# Patient Record
Sex: MALE | Race: WHITE | NOT HISPANIC OR LATINO
[De-identification: names, ages, dates, MRNs, and addresses within clinical notes are randomized per-mention and may not be internally consistent; named-entity substitution may affect disease eponyms.]

---
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
## 2020-04-10 ENCOUNTER — APPOINTMENT (OUTPATIENT)
Dept: PEDIATRIC GASTROENTEROLOGY | Facility: CLINIC | Age: 20
End: 2020-04-10
Payer: COMMERCIAL

## 2020-04-10 DIAGNOSIS — K92.1 MELENA: ICD-10-CM

## 2020-04-10 PROCEDURE — 99214 OFFICE O/P EST MOD 30 MIN: CPT | Mod: 95

## 2020-04-10 RX ORDER — AMOXICILLIN AND CLAVULANATE POTASSIUM 875; 125 MG/1; MG/1
875-125 TABLET, COATED ORAL
Qty: 28 | Refills: 0 | Status: COMPLETED | COMMUNITY
Start: 2020-02-29

## 2020-05-26 RX ORDER — MESALAMINE 1000 MG/1
1000 SUPPOSITORY RECTAL
Qty: 90 | Refills: 2 | Status: ACTIVE | COMMUNITY
Start: 2020-04-17 | End: 1900-01-01

## 2021-02-08 ENCOUNTER — APPOINTMENT (OUTPATIENT)
Dept: COLORECTAL SURGERY | Facility: CLINIC | Age: 21
End: 2021-02-08
Payer: COMMERCIAL

## 2021-02-08 VITALS
SYSTOLIC BLOOD PRESSURE: 110 MMHG | OXYGEN SATURATION: 98 % | HEIGHT: 68 IN | HEART RATE: 75 BPM | DIASTOLIC BLOOD PRESSURE: 71 MMHG | TEMPERATURE: 97 F | BODY MASS INDEX: 21.98 KG/M2 | RESPIRATION RATE: 15 BRPM | WEIGHT: 145 LBS

## 2021-02-08 DIAGNOSIS — T69.1XXA CHILBLAINS, INITIAL ENCOUNTER: ICD-10-CM

## 2021-02-08 DIAGNOSIS — K51.20 ULCERATIVE (CHRONIC) PROCTITIS W/OUT COMPLICATIONS: ICD-10-CM

## 2021-02-08 DIAGNOSIS — Z80.3 FAMILY HISTORY OF MALIGNANT NEOPLASM OF BREAST: ICD-10-CM

## 2021-02-08 PROCEDURE — 99205 OFFICE O/P NEW HI 60 MIN: CPT | Mod: 25

## 2021-02-08 PROCEDURE — 99072 ADDL SUPL MATRL&STAF TM PHE: CPT

## 2021-02-08 PROCEDURE — 46600 DIAGNOSTIC ANOSCOPY SPX: CPT

## 2021-02-08 RX ORDER — CETIRIZINE HCL 10 MG
10 TABLET ORAL
Refills: 0 | Status: ACTIVE | COMMUNITY

## 2021-02-08 RX ORDER — MESALAMINE 1.2 G/1
1.2 TABLET, DELAYED RELEASE ORAL
Refills: 0 | Status: ACTIVE | COMMUNITY

## 2021-02-08 NOTE — HISTORY OF PRESENT ILLNESS
[FreeTextEntry1] : Patient is a 21 yo WM here to discuss his colitis.  He states that back in 2015 he was diagnosed with C-d iff and then referred to see GI.  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  He was initially treated with mesalamine suppositories and then it did go dormant and he did not require any treatment until about 10 months ago.  At that time he developed bloody bowel movements, increase in bowel movements all loose in nature, abdominal pain.  He was given a colonoscopy and that showed colitis and he was restarted on mesalamine oral and suppository.  He medication was increased a few weeks ago and there has been a slight improvement.

## 2021-02-08 NOTE — REASON FOR VISIT
[Consultation] : a consultation visit [Parent] : parent [FreeTextEntry1] : Patient intake forms reviewed

## 2021-02-08 NOTE — REVIEW OF SYSTEMS
[Abdominal Pain] : abdominal pain [Diarrhea] : diarrhea [Negative] : Heme/Lymph [FreeTextEntry7] : colitis [de-identified] : Eczema

## 2021-02-08 NOTE — PHYSICAL EXAM
[Normal Breath Sounds] : Normal breath sounds [Normal Heart Sounds] : normal heart sounds [Normal Rate and Rhythm] : normal rate and rhythm [No Rash or Lesion] : No rash or lesion [Alert] : alert [Oriented to Person] : oriented to person [Oriented to Place] : oriented to place [Oriented to Time] : oriented to time [Calm] : calm [None] : no anal fissures seen [Excoriation] : no perianal excoriation [Multiple Sinus Tracts] : no perianal sinus tracts [Fistula] : no fistulas [Wart] : no warts [Ulcer ___ cm] : no ulcers [Pilonidal Cyst] : no pilonidal cysts [Pilonidal Sinus] : no pilonidal sinus [Pilonidal Sinus Draining] : no pilonidal sinus drainage [Tender, Swollen] : nontender, non-swollen [Thrombosed] : that was not thrombosed [Skin Tags] : there were no residual hemorrhoidal skin tags seen [Normal] : was normal [JVD] : no jugular venous distention  [Wheezing] : no wheezing was heard [de-identified] : soft, NT/ND, +BS [de-identified] : anoscopy / sig reveals some protitis and dark blood / stool [de-identified] : well nourished male [de-identified] : NC/AT [de-identified] : AMANDA/+ROM [de-identified] : Intact

## 2021-03-22 ENCOUNTER — RESULT REVIEW (OUTPATIENT)
Age: 21
End: 2021-03-22

## 2021-06-18 ENCOUNTER — RESULT REVIEW (OUTPATIENT)
Age: 21
End: 2021-06-18

## 2022-03-30 ENCOUNTER — APPOINTMENT (OUTPATIENT)
Dept: PEDIATRIC ALLERGY IMMUNOLOGY | Facility: CLINIC | Age: 22
End: 2022-03-30
Payer: COMMERCIAL

## 2022-03-30 VITALS
RESPIRATION RATE: 20 BRPM | WEIGHT: 143 LBS | TEMPERATURE: 97.4 F | HEIGHT: 67 IN | HEART RATE: 70 BPM | OXYGEN SATURATION: 99 % | BODY MASS INDEX: 22.44 KG/M2

## 2022-03-30 DIAGNOSIS — L50.3 DERMATOGRAPHIC URTICARIA: ICD-10-CM

## 2022-03-30 DIAGNOSIS — J30.9 ALLERGIC RHINITIS, UNSPECIFIED: ICD-10-CM

## 2022-03-30 PROCEDURE — 99213 OFFICE O/P EST LOW 20 MIN: CPT

## 2022-03-30 RX ORDER — TRETINOIN 0.25 MG/G
0.03 CREAM TOPICAL
Qty: 20 | Refills: 0 | Status: DISCONTINUED | COMMUNITY
Start: 2017-07-17 | End: 2022-03-30

## 2022-03-30 RX ORDER — MUPIROCIN 20 MG/G
2 OINTMENT TOPICAL
Qty: 22 | Refills: 0 | Status: DISCONTINUED | COMMUNITY
Start: 2017-07-26 | End: 2022-03-30

## 2022-03-30 RX ORDER — USTEKINUMAB 90 MG/ML
90 INJECTION, SOLUTION SUBCUTANEOUS
Refills: 0 | Status: ACTIVE | COMMUNITY

## 2022-03-30 NOTE — PHYSICAL EXAM
[Alert] : alert [Well Nourished] : well nourished [Healthy Appearance] : healthy appearance [No Acute Distress] : no acute distress [Well Developed] : well developed [Normal Voice/Communication] : normal voice communication [Normal Pupil & Iris Size/Symmetry] : normal pupil and iris size and symmetry [No Discharge] : no discharge [No Photophobia] : no photophobia [Sclera Not Icteric] : sclera not icteric [Normal TMs] : both tympanic membranes were normal [Normal Nasal Mucosa] : the nasal mucosa was normal [Normal Lips/Tongue] : the lips and tongue were normal [Normal Outer Ear/Nose] : the ears and nose were normal in appearance [No Nasal Discharge] : no nasal discharge [Normal Tonsils] : normal tonsils [No Thrush] : no thrush [No Neck Mass] : no neck mass was observed [Normal Rate and Effort] : normal respiratory rhythm and effort [Bilateral Audible Breath Sounds] : bilateral audible breath sounds [Normal Rate] : heart rate was normal  [Normal S1, S2] : normal S1 and S2 [Normal Cervical Lymph Nodes] : cervical [Skin Intact] : skin intact  [Wheezing] : no wheezing was heard

## 2022-03-30 NOTE — SOCIAL HISTORY
[Mother] : mother [Father] : father [College] : College [House] : [unfilled] lives in a house  [Central Forced Air] : heating provided by central forced air [Central] : air conditioning provided by central unit [Dehumidifier] : uses a dehumidifier [Dry] : dry [Dust Mite Covers] : has dust mite covers [Bedroom] :  in bedroom [Basement] :  in basement  [None] : none [Single] : single [Humidifier] : does not use a humidifier [Feather Pillows] : does not have feather pillows [Feather Comforter] : does not have a feather comforter [Living Area] : not in the living area [Smokers in Household] : there are no smokers in the home [de-identified] : basketball

## 2022-03-30 NOTE — REVIEW OF SYSTEMS
[Urticaria] : urticaria [Nl] : Gastrointestinal [Immunizations are up to date] : Immunizations are up to date [de-identified] : Dermatographia

## 2022-03-30 NOTE — HISTORY OF PRESENT ILLNESS
[de-identified] : 21 y.o male last seen 5/2019  with history of seasonal allergic rhinitis and conjunctivitis with positive ST 5/2010 to DM, Tree, cat, grass, and dog. Patient is typically symptomatic from April-August. He does well during the fall and winter. his symptoms usually consist of nasal congestion and itchy eyes. In the past he's been on an antihistamine(Allegra 180, Claritin 10mg or Zyrtec 10mg) and Singulair 10mg which has helped. He dislikes using nasal sprays. 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 Dermatographia was diagnosed a few years ago by another physician. The lesions he was getting were linear raised and itchy. Since starting the Zyrtec 10mg he's remained symptom free. He currently takes it every other day.\par

## 2022-03-30 NOTE — ASSESSMENT
[FreeTextEntry1] : 21 y.o male last seen 5/2019  presents with history of seasonal allergic rhinitis and conjunctivitis with positive ST 5/2010 to DM, Tree, cat, grass, and dog. Presents to prepare for spring season and currently controlled on Zyrtec 10mg 1 tab PO QOD for dermatographia.\par \par \par DIONICIO/SAC\par -Start Start Allegra 180mg 1 tab PO QD or Zyrtec 10 mg qd as below for dermatographia.\par -Can consider Singulair and Azelastine 0.05% eye drops 1 gtt BID is symptoms not adequately controlled which he refused.\par \par \par Dermatographia\par -Continue Zyrtec 10mg 1 tab PO QoD thorough the summer. Can try and taper off slowly after allergy season as urticarial lesions can often resolve spontaneously.\par instruct patient that Allegra can be used to treat dermatographia as well. \par \par Follow-up in one year\par \par Patient was seen with  DERRICK Escudero\par \par Total MD time spent on this encounter was 25 minutes.  This includes time devoted to preparing to see the patient with review of previous medical record, obtaining medical history, performing physical exam, counseling and patient education with patient and family, ordering medications and lab studies, documentation in the medical record and coordination of care.\par

## 2022-04-11 RX ORDER — FEXOFENADINE HYDROCHLORIDE 180 MG/1
180 TABLET ORAL DAILY
Qty: 90 | Refills: 1 | Status: ACTIVE | COMMUNITY
Start: 2022-04-11 | End: 1900-01-01

## 2022-11-10 ENCOUNTER — APPOINTMENT (OUTPATIENT)
Dept: ORTHOPEDIC SURGERY | Facility: CLINIC | Age: 22
End: 2022-11-10

## 2022-11-10 VITALS — BODY MASS INDEX: 21.98 KG/M2 | WEIGHT: 145 LBS | HEIGHT: 68 IN

## 2022-11-10 PROCEDURE — 73610 X-RAY EXAM OF ANKLE: CPT | Mod: RT

## 2022-11-10 PROCEDURE — 99203 OFFICE O/P NEW LOW 30 MIN: CPT

## 2022-11-10 NOTE — PHYSICAL EXAM
[Able to Communicate] : able to communicate [Well Developed] : well developed [Well Nourished] : well nourished [5___] : eversion 5[unfilled]/5 [Right] : right ankle [There are no fractures, subluxations or dislocations. No significant abnormalities are seen] : There are no fractures, subluxations or dislocations. No significant abnormalities are seen [de-identified] : average [] : negative anterior drawer at ankle

## 2022-11-10 NOTE — HISTORY OF PRESENT ILLNESS
[Sudden] : sudden [4] : 4 [0] : 0 [Dull/Aching] : dull/aching [Tightness] : tightness [Intermittent] : intermittent [Rest] : rest [Sitting] : sitting [Standing] : standing [Walking] : walking [Stairs] : stairs [Full time] : Work status: full time [de-identified] : 11/10/22  Initial visit for this 22 year male who was dancing x 6 days ago and rolled his rt ankle and fell. C/o acute pain and swelling since. Iced it and was limping. has been slowly improving.\par PMH: NO prior issues [] : no [FreeTextEntry1] : right ankle [FreeTextEntry3] : 11/4/22 [FreeTextEntry4] : 10pm

## 2023-05-08 ENCOUNTER — APPOINTMENT (OUTPATIENT)
Dept: ORTHOPEDIC SURGERY | Facility: CLINIC | Age: 23
End: 2023-05-08
Payer: COMMERCIAL

## 2023-05-08 VITALS — HEIGHT: 68 IN | BODY MASS INDEX: 21.98 KG/M2 | WEIGHT: 145 LBS

## 2023-05-08 DIAGNOSIS — S93.401A SPRAIN OF UNSPECIFIED LIGAMENT OF RIGHT ANKLE, INITIAL ENCOUNTER: ICD-10-CM

## 2023-05-08 PROCEDURE — 73610 X-RAY EXAM OF ANKLE: CPT | Mod: RT

## 2023-05-08 PROCEDURE — 99214 OFFICE O/P EST MOD 30 MIN: CPT

## 2023-05-08 NOTE — PHYSICAL EXAM
[Able to Communicate] : able to communicate [Well Developed] : well developed [Well Nourished] : well nourished [5___] : eversion 5[unfilled]/5 [Right] : right ankle [There are no fractures, subluxations or dislocations. No significant abnormalities are seen] : There are no fractures, subluxations or dislocations. No significant abnormalities are seen [de-identified] : average [] : negative anterior drawer at ankle [FreeTextEntry3] : Area of patchy ecchymosis over the dorsolateral aspect of the foot.  Mild swelling.

## 2023-05-08 NOTE — HISTORY OF PRESENT ILLNESS
[Sports related] : sports related [2] : 2 [0] : 0 [Dull/Aching] : dull/aching [Tightness] : tightness [Intermittent] : intermittent [Walking] : walking [de-identified] : 05/08/23:  Return visit for a 22 year old male playing basketball and rolled rt  ankle x 3 days ago. C/o pain and swelling since. Limping.  Applied ice at home and rested over the last couple of days.\par \par 11/10/22  Initial visit for this 22 year male who was dancing x 6 days ago and rolled his rt ankle and fell. C/o acute pain and swelling since. Iced it and was limping. has been slowly improving.\par PMH: NO prior issues [] : no [FreeTextEntry1] : right ankle [FreeTextEntry3] : 5/5/23 [FreeTextEntry5] : while playing recreational basketball twisted right ankle [de-identified] : none

## 2023-05-08 NOTE — REASON FOR VISIT
[FreeTextEntry2] : Pain and swelling right ankle past 3 days since twisting ankle playing recreational basketball.